# Patient Record
Sex: FEMALE | Race: WHITE | NOT HISPANIC OR LATINO | URBAN - METROPOLITAN AREA
[De-identification: names, ages, dates, MRNs, and addresses within clinical notes are randomized per-mention and may not be internally consistent; named-entity substitution may affect disease eponyms.]

---

## 2017-05-31 ENCOUNTER — HISTORICAL (OUTPATIENT)
Dept: ADMINISTRATIVE | Facility: HOSPITAL | Age: 47
End: 2017-05-31

## 2018-06-11 ENCOUNTER — HISTORICAL (OUTPATIENT)
Dept: ADMINISTRATIVE | Facility: HOSPITAL | Age: 48
End: 2018-06-11

## 2019-09-16 ENCOUNTER — HISTORICAL (OUTPATIENT)
Dept: ADMINISTRATIVE | Facility: HOSPITAL | Age: 49
End: 2019-09-16

## 2019-11-27 ENCOUNTER — HISTORICAL (OUTPATIENT)
Dept: ADMINISTRATIVE | Facility: HOSPITAL | Age: 49
End: 2019-11-27

## 2020-02-11 ENCOUNTER — HISTORICAL (OUTPATIENT)
Dept: ADMINISTRATIVE | Facility: HOSPITAL | Age: 50
End: 2020-02-11

## 2020-02-14 LAB
FINAL CULTURE: NORMAL
GRAM STN SPEC: NORMAL

## 2020-02-16 LAB
FINAL CULTURE: NORMAL
FINAL CULTURE: NORMAL

## 2021-11-12 ENCOUNTER — HISTORICAL (OUTPATIENT)
Dept: ADMINISTRATIVE | Facility: HOSPITAL | Age: 51
End: 2021-11-12

## 2021-11-12 LAB — SARS-COV-2 RNA RESP QL NAA+PROBE: NEGATIVE

## 2022-01-17 ENCOUNTER — HISTORICAL (OUTPATIENT)
Dept: LAB | Facility: HOSPITAL | Age: 52
End: 2022-01-17

## 2022-04-07 ENCOUNTER — HISTORICAL (OUTPATIENT)
Dept: ADMINISTRATIVE | Facility: HOSPITAL | Age: 52
End: 2022-04-07
Payer: COMMERCIAL

## 2022-04-23 VITALS
BODY MASS INDEX: 23.6 KG/M2 | OXYGEN SATURATION: 97 % | HEIGHT: 61 IN | DIASTOLIC BLOOD PRESSURE: 83 MMHG | WEIGHT: 125 LBS | SYSTOLIC BLOOD PRESSURE: 125 MMHG

## 2022-05-01 NOTE — HISTORICAL [facility] CERNER
This is a historical note converted from [employer]. Formatting and pictures may have been removed.  Please reference [employer] for original formatting and attached multimedia. Chief Complaint  Tachycardia, weakness, fatigue, SOB, CP with deep breaths. Froathy white sputum cough. Low grade fever. Seen at  on [place]. Cardio Dr. Rojas & Terrence.  History of Present Illness  Pt is a 49 year old WF with hx of cardiomyopathy and cardiac arrest s/p AICD who presents to the ED with c/o of a productive cough. Pt states she has been suffering with symptoms for approx. 3 weeks.?She states shes experiencing a cough that is producing clear sputum, nasal congestion, a fever, HA, and has vomited. Pt was sent here from  with an abnormal chest x-ray and reports that she cant take a deep breath.? CT revealed mainly LLL infiltrate and?other patchy areas.? Labs largely unremarkable.  Review of Systems  Except as documented all systems were reviewed and are negative.  Physical Exam  Vitals & Measurements  T:?37.5? ?C (Oral)? HR:?123(Peripheral)? HR:?123(Monitored)? RR:?20? BP:?123/71? SpO2:?94%?  General:?in no acute distress  Eye: PERRLA, EOMI, clear conjunctiva, eyelids normal  HEENT:?oropharynx without erythema/exudate, oropharynx and nasal mucosal surfaces moist  Neck: full range of motion, no thyromegaly or lymphadenopathy  Respiratory:?diffuse expiratory wheezes  Cardiovascular:?tachycardic with regular rhythm  Gastrointestinal:?soft, non-tender, non-distended with normal bowel sounds  Musculoskeletal:?full range of motion of all extremities without limitation or discomfort  Integumentary: no rashes or skin lesions present  Neurologic: cranial nerves grossly intact, no signs of peripheral neurological deficit, motor/sensory function intact  Psychiatric: Cooperative  ?  Assessment/Plan  1.?CAP (community acquired pneumonia)?J18.9  ?bacterial, LLL  Ordered:  ?  2.?Sepsis with acute hypoxic respiratory failure without septic  shock?A41.9  ?  3.?Nonischemic congestive cardiomyopathy?I42.0  ?  4.?AICD (automatic cardioverter/defibrillator) present?Z95.810  ?  ?  Plan:  Continue empiric antibiotics, nebulized bronchodilators, supplemental oxygen, and other supportive care.  Cardiology evaluation  Replace magnesium  ?   DVT prophylaxis: Lovenox  PCP: Dr. Radha Monge  Cardiology: Dr. Blane Avila  Code status: full   Problem List/Past Medical History  Historical  ICD - Internal cardiac defibrillator procedure  Nonischemic congestive cardiomyopathy  Procedure/Surgical History  Insertion of Defibrillator Generator into Chest Subcutaneous Tissue and Fascia, Open Approach (08/23/2019)  Removal of Cardiac Rhythm Related Device from Trunk Subcutaneous Tissue and Fascia, Open Approach (08/23/2019)  Bypass Trachea to Cutaneous with Tracheostomy Device, Open Approach (08/20/2019)  Tracheostomy (.) (08/20/2019)  Transfusion of Nonautologous Red Blood Cells into Peripheral Vein, Percutaneous Approach (08/13/2019)  Insertion of Endotracheal Airway into Trachea, Via Natural or Artificial Opening (08/10/2019)  Insertion of Monitoring Device into Lower Artery, Percutaneous Approach (08/10/2019)  Introduction of Vasopressor into Peripheral Vein, Percutaneous Approach (08/10/2019)  Respiratory Ventilation, Greater than 96 Consecutive Hours (08/10/2019)  ICD - Internal cardiac defibrillator procedure   Medications  Home  carvedilol 12.5 mg oral tablet, 12.5 mg= 1 tab(s), Oral, BID  furosemide 20 mg oral tablet, 10 mg= 0.5 tab(s), Oral, Daily,? ?Not Taking, Completed Rx  lisinopril 5 mg oral tablet, 5 mg= 1 tab(s), Oral, Daily  meloxicam 7.5 mg oral tablet, 7.5 mg= 1 tab(s), Oral, Daily  Santyl 250 units/g topical ointment, 1 ricky, TOP, Daily, 1 refills,? ?Not Taking, Completed Rx  Silvadene 1% topical cream, See Instructions, 1 refills,? ?Not Taking, Completed Rx  traZODONE 50 mg oral tablet ( Desyrel ), 50 mg= 1 tab(s), Oral, At Bedtime  Triple Antibiotic  Ointment topical, See Instructions,? ?Not Taking, Completed Rx  Allergies  No Known Allergies  No Known Medication Allergies  Social History  Abuse/Neglect  No, 02/11/2020  Alcohol  Current, Beer, 1-2 times per week, 11/27/2019  Substance Use  Never, 02/11/2020  Tobacco  Never (less than 100 in lifetime), N/A, 02/11/2020  Family History  Cardiac arrest.: Father.  Lab Results  Labs Last 24 Hours?  ?Chemistry? Hematology/Coagulation?   Sodium Lvl: 139 mmol/L (02/11/20 14:39:00) WBC:?11.8 x10(3)/mcL?High (02/11/20 14:39:00)   Potassium Lvl: 3.6 mmol/L (02/11/20 14:39:00) RBC: 4.53 x10(6)/mcL (02/11/20 14:39:00)   Chloride: 104 mmol/L (02/11/20 14:39:00) Hgb: 14.1 gm/dL (02/11/20 14:39:00)   CO2: 29 mmol/L (02/11/20 14:39:00) Hct: 41.9 % (02/11/20 14:39:00)   Calcium Lvl: 9.3 mg/dL (02/11/20 14:39:00) Platelet: 267 x10(3)/mcL (02/11/20 14:39:00)   Magnesium Lvl:?1.7 mg/dL?Low (02/11/20 14:39:00) MCV: 92.5 fL (02/11/20 14:39:00)   Glucose Lvl:?110 mg/dL?High (02/11/20 14:39:00) MCH:?31.1 pg?High (02/11/20 14:39:00)   BUN:?22 mg/dL?High (02/11/20 14:39:00) MCHC: 33.7 gm/dL (02/11/20 14:39:00)   Creatinine: 0.61 mg/dL (02/11/20 14:39:00) RDW: 12.3 % (02/11/20 14:39:00)   eGFR-AA: >60 (02/11/20 14:39:00) MPV: 9.4 fL (02/11/20 14:39:00)   eGFR-PURNIMA: >60 (02/11/20 14:39:00) Abs Neut:?10.13 x10(3)/mcL?High (02/11/20 14:39:00)   Bili Total: 0.7 mg/dL (02/11/20 14:39:00) Neutro Auto: 86 % (02/11/20 14:39:00)   Bili Direct: 0.2 mg/dL (02/11/20 14:39:00) Lymph Auto: 6 % (02/11/20 14:39:00)   Bili Indirect: 0.5 mg/dL (02/11/20 14:39:00) Mono Auto: 7 % (02/11/20 14:39:00)   AST:?13 unit/L?Low (02/11/20 14:39:00) Eos Auto: 0 % (02/11/20 14:39:00)   ALT: 21 unit/L (02/11/20 14:39:00) Abs Eos: 0 x10(3)/mcL (02/11/20 14:39:00)   Alk Phos: 57 unit/L (02/11/20 14:39:00) Basophil Auto: 1 % (02/11/20 14:39:00)   Total Protein: 6.7 gm/dL (02/11/20 14:39:00) Abs Neutro:?10.13 x10(3)/mcL?High (02/11/20 14:39:00)   Albumin Lvl: 3.7 gm/dL  (02/11/20 14:39:00) Abs Lymph: 0.7 x10(3)/mcL (02/11/20 14:39:00)   Globulin: 3 gm/dL (02/11/20 14:39:00) Abs Mono: 0.8 x10(3)/mcL (02/11/20 14:39:00)   A/G Ratio: 1.2 ratio (02/11/20 14:39:00) Abs Baso: 0.1 x10(3)/mcL (02/11/20 14:39:00)   BNP: 52 pg/mL (02/11/20 14:39:00)    Troponin-I: <0.02 (02/11/20 14:39:00)    TSH: 1.65 mIU/L (02/11/20 14:39:00)    Diagnostic Results  Accession:?[id number]  Order:?CT Thorax W Contrast  Report Dt/Tm:?02/11/2020 16:43  Report:?  EXAMINATION: CT the thorax with contrast  ?  EXAMINATION DATE: 2/11/2020  ?  COMPARISON: Chest 2 views from same day  ?  TECHNIQUE: Multiple cross-sectional images of the thorax were obtained  after the intravenous administration of contrast. Coronal and sagittal  reformatted images were obtained.  ?  CLINICAL HISTORY: Weakness  ?  FINDINGS:  ?  The heart size is enlarged the course and caliber the thoracic aorta  is normal. A triple vessel aortic arch is identified with the great  vessels being widely patent. The main pulmonary artery is of normal  caliber. No hilar or mediastinal adenopathy. Incidental note of a left  chest wall AICD.  ?  Consolidative opacity within the left retrocardiac region of the left  lower lobe with associated air bronchogram. The parenchyma is  hypoenhancing. No evidence for adjacent reactive effusion. Also patchy  airspace opacity within the medial segment of the middle lobe. Other  patchy airspace opacities are identified. No pleural thickening or  pleural effusion. The trachea and airways are patent.  ?  The visualized hollow and solid viscera of the upper abdomen are  normal.  ?  No suspicious osseous lesions. The soft tissues are normal.  ?  IMPRESSION:  ?  Consolidative changes of the left lower lobe as well as other patchy  airspace opacities which are likely infectious/inflammatory. Other  secondary findings as above.  ?

## 2022-05-01 NOTE — HISTORICAL [facility]ER
This is a historical note converted from [employer]. Formatting and pictures may have been removed.  Please reference [employer] for original formatting and attached multimedia. Admit and Discharge Dates  Admit Date: 02/11/2020  Discharge Date: 02/13/2020  Physicians  Attending Physician - ER, Physician  Admitting Physician - ER, Physician  Consulting Physician - Margarita PACKER, Blane BAIRES  Consulting Physician - Sarina LOONEY MD, Corona BAIRES  Primary Care Physician - Saleem PACKER, Radha MARTÍNEZ  Discharge Diagnosis  1.?CAP (community acquired pneumonia)?J18.9  2.?Sepsis with acute hypoxic respiratory failure without septic shock?A41.9  3.?Nonischemic congestive cardiomyopathy?I42.0  4.?AICD (automatic cardioverter/defibrillator) present?Z95.810  Surgical Procedures  No procedures recorded for this visit.  Immunizations  No immunizations recorded for this visit.  Hospital Course  49 year old WF with a history of cardiomyopathy and cardiac arrest s/p AICD who presents to the ED with c/o of a productive cough. Pt states she has been suffering with symptoms for approx. 3 weeks. She states shes experiencing a cough that is producing clear sputum, nasal congestion, fever, HA, and has vomited. Pt was sent here from  with an abnormal chest x-ray and reports that she cant take a deep breath. ?CT revealed mainly LLL infiltrate and other patchy areas. ?Labs mostly unremarkable; did have a white count of 11,800 with leftward shift. ? ?She was noted to be tachycardic in the emergency room, though otherwise hemodynamically stable. ?She was started on broad-spectrum antibiotics and admitted to hospital medicine.  Patient was managed for community-acquired pneumonia and was placed on IV ceftriaxone.? Her white cell count trended down to normal. ?No fever spikes?saturating?at goal. ?She is transition to p.o. Augmentin for 5 more days. ?She will continue on Mucinex for cough. ?She will follow-up with her primary care physician in a week.  She is known to have?PVCs,  she will follow-up with her EP physician?on outpatient she is to continue her beta-blocker.  Significant Findings  Labs Last 24 Hours?  ?Hematology/Coagulation?   WBC: 11.5 x10(3)/mcL (02/13/20 03:58:00)   RBC:?3.96 x10(6)/mcL?Low (02/13/20 03:58:00)   Hgb: 12.2 gm/dL (02/13/20 03:58:00)   Hct: 37.4 % (02/13/20 03:58:00)   Platelet: 268 x10(3)/mcL (02/13/20 03:58:00)   MCV:?94.4 fL?High (02/13/20 03:58:00)   MCH: 30.8 pg (02/13/20 03:58:00)   MCHC:?32.6 gm/dL?Low (02/13/20 03:58:00)   RDW: 12.4 % (02/13/20 03:58:00)   MPV: 9.7 fL (02/13/20 03:58:00)   Abs Neut: 8.19 x10(3)/mcL (02/13/20 03:58:00)   Neutro Auto: 71 % (02/13/20 03:58:00)   Lymph Auto: 21 % (02/13/20 03:58:00)   Mono Auto: 5 % (02/13/20 03:58:00)   Eos Auto: 2 % (02/13/20 03:58:00)   Abs Eos: 0.2 x10(3)/mcL (02/13/20 03:58:00)   Basophil Auto: 0 % (02/13/20 03:58:00)   Abs Neutro: 8.19 x10(3)/mcL (02/13/20 03:58:00)   Abs Lymph: 2.4 x10(3)/mcL (02/13/20 03:58:00)   Abs Mono: 0.6 x10(3)/mcL (02/13/20 03:58:00)   Abs Baso: 0 x10(3)/mcL (02/13/20 03:58:00)   ?  ?  CT thorax  IMPRESSION:  ?  Consolidative changes of the left lower lobe as well as other patchy  airspace opacities which are likely infectious/inflammatory. Other  secondary findings as above.  ?  Time Spent on discharge  > 30 minutes  Objective  Vitals & Measurements  T:?36.7? ?C (Oral)? TMIN:?36.6? ?C (Oral)? TMAX:?36.9? ?C (Oral)? HR:?70(Peripheral)? RR:?18? BP:?117/76? SpO2:?95%?  Physical Exam  General: age appropriate, in no acute distress  Eye: pupils are equal , round and reactive to light?  HEENT: Normocephalic  Neck: Supple?  Respiratory; Lungs are clear to auscultation, respirations are non labored?  Cardiovascular: Normal rate and rhythm, s1s2 only no edema?  Gastrointestinal: soft , non tender, normal bowel sounds?  Integumentary: Warm,?  Neurologic: Alert, oriented , no focal deficits?  Psychiatric: cooperative  Patient Discharge Condition  stable  Discharge Disposition  home    Discharge Medication Reconciliation  Prescribed  amoxicillin-clavulanate (Augmentin 875 mg-125 mg oral tablet)?1 tab(s), Oral, q12hr  guaiFENesin (Mucinex 600 mg oral tablet, extended release)?600 mg, Oral, BID  Continue  carvedilol (carvedilol 12.5 mg oral tablet)?12.5 mg, Oral, BID  lisinopril (lisinopril 5 mg oral tablet)?5 mg, Oral, Daily  traZODone (traZODONE 50 mg oral tablet ( Desyrel ))?50 mg, Oral, At Bedtime  Discontinue  bacitracin/neomycin/polymyxin B topical (Triple Antibiotic Ointment topical)?See Instructions  collagenase topical (Santyl 250 units/g topical ointment)?1 ricky, TOP, Daily  furosemide (furosemide 20 mg oral tablet)?10 mg, Oral, Daily  meloxicam (meloxicam 7.5 mg oral tablet)?7.5 mg, Oral, Daily  silver sulfADIAZINE topical (Silvadene 1% topical cream)?See Instructions  Education and Orders Provided  Community-Acquired Pneumonia, Adult, Easy-to-Read  Discharge - 02/13/20 8:38:00 CST, Home?  Follow up  Saleem PACKER, Radha MARTÍNEZ, on 02/19/2020  ????  Mary Avila, on 02/21/2020  Alcon Monterroso MD, on 03/05/2020  ????  PVCs

## 2022-08-10 ENCOUNTER — LAB REQUISITION (OUTPATIENT)
Dept: LAB | Facility: HOSPITAL | Age: 52
End: 2022-08-10
Payer: COMMERCIAL

## 2022-08-10 DIAGNOSIS — E04.9 NONTOXIC GOITER, UNSPECIFIED: ICD-10-CM

## 2022-08-10 DIAGNOSIS — I47.10 SUPRAVENTRICULAR TACHYCARDIA: ICD-10-CM

## 2022-08-10 LAB
ALBUMIN SERPL-MCNC: 3.8 GM/DL (ref 3.5–5)
ALBUMIN/GLOB SERPL: 1.4 RATIO (ref 1.1–2)
ALP SERPL-CCNC: 56 UNIT/L (ref 40–150)
ALT SERPL-CCNC: 17 UNIT/L (ref 0–55)
AST SERPL-CCNC: 23 UNIT/L (ref 5–34)
BILIRUBIN DIRECT+TOT PNL SERPL-MCNC: 0.5 MG/DL
BUN SERPL-MCNC: 13.7 MG/DL (ref 9.8–20.1)
CALCIUM SERPL-MCNC: 9.7 MG/DL (ref 8.4–10.2)
CHLORIDE SERPL-SCNC: 106 MMOL/L (ref 98–107)
CHOLEST SERPL-MCNC: 202 MG/DL
CHOLEST/HDLC SERPL: 2 {RATIO} (ref 0–5)
CO2 SERPL-SCNC: 32 MMOL/L (ref 22–29)
CREAT SERPL-MCNC: 0.8 MG/DL (ref 0.55–1.02)
GFR SERPLBLD CREATININE-BSD FMLA CKD-EPI: >60 MLS/MIN/1.73/M2
GLOBULIN SER-MCNC: 2.8 GM/DL (ref 2.4–3.5)
GLUCOSE SERPL-MCNC: 107 MG/DL (ref 74–100)
HDLC SERPL-MCNC: 94 MG/DL (ref 35–60)
LDLC SERPL CALC-MCNC: 96 MG/DL (ref 50–140)
POTASSIUM SERPL-SCNC: 5.1 MMOL/L (ref 3.5–5.1)
PROT SERPL-MCNC: 6.6 GM/DL (ref 6.4–8.3)
SODIUM SERPL-SCNC: 142 MMOL/L (ref 136–145)
T3FREE SERPL-MCNC: 2.04 PG/ML (ref 1.57–3.91)
T4 FREE SERPL-MCNC: 1.05 NG/DL (ref 0.7–1.48)
TRIGL SERPL-MCNC: 62 MG/DL (ref 37–140)
TSH SERPL-ACNC: 5.43 UIU/ML (ref 0.35–4.94)
VLDLC SERPL CALC-MCNC: 12 MG/DL

## 2022-08-10 PROCEDURE — 84443 ASSAY THYROID STIM HORMONE: CPT | Performed by: INTERNAL MEDICINE

## 2022-08-10 PROCEDURE — 84436 ASSAY OF TOTAL THYROXINE: CPT | Performed by: INTERNAL MEDICINE

## 2022-08-10 PROCEDURE — 84439 ASSAY OF FREE THYROXINE: CPT | Performed by: INTERNAL MEDICINE

## 2022-08-10 PROCEDURE — 84481 FREE ASSAY (FT-3): CPT | Performed by: INTERNAL MEDICINE

## 2022-08-10 PROCEDURE — 80061 LIPID PANEL: CPT | Performed by: INTERNAL MEDICINE

## 2022-08-10 PROCEDURE — 80053 COMPREHEN METABOLIC PANEL: CPT | Performed by: INTERNAL MEDICINE

## 2022-08-11 LAB — T4 SERPL IA-MCNC: 7.8 MCG/DL (ref 4.5–11.7)

## 2024-05-10 ENCOUNTER — OFFICE VISIT (OUTPATIENT)
Dept: URGENT CARE | Facility: CLINIC | Age: 54
End: 2024-05-10
Payer: COMMERCIAL

## 2024-05-10 VITALS
OXYGEN SATURATION: 98 % | HEIGHT: 61 IN | WEIGHT: 140 LBS | HEART RATE: 90 BPM | SYSTOLIC BLOOD PRESSURE: 132 MMHG | DIASTOLIC BLOOD PRESSURE: 90 MMHG | RESPIRATION RATE: 18 BRPM | BODY MASS INDEX: 26.43 KG/M2 | TEMPERATURE: 98 F

## 2024-05-10 DIAGNOSIS — J02.9 SORE THROAT: Primary | ICD-10-CM

## 2024-05-10 LAB
CTP QC/QA: YES
MOLECULAR STREP A: NEGATIVE

## 2024-05-10 PROCEDURE — 99213 OFFICE O/P EST LOW 20 MIN: CPT | Mod: ,,, | Performed by: FAMILY MEDICINE

## 2024-05-10 PROCEDURE — 87651 STREP A DNA AMP PROBE: CPT | Mod: QW,,, | Performed by: FAMILY MEDICINE

## 2024-05-10 RX ORDER — SACUBITRIL AND VALSARTAN 24; 26 MG/1; MG/1
1 TABLET, FILM COATED ORAL 2 TIMES DAILY
COMMUNITY

## 2024-05-10 RX ORDER — SERTRALINE HYDROCHLORIDE 25 MG/1
25 TABLET, FILM COATED ORAL
COMMUNITY
Start: 2021-11-12

## 2024-05-10 RX ORDER — PREDNISONE 20 MG/1
40 TABLET ORAL DAILY
Qty: 8 TABLET | Refills: 0 | Status: SHIPPED | OUTPATIENT
Start: 2024-05-10 | End: 2024-05-14

## 2024-05-10 RX ORDER — CARVEDILOL 6.25 MG/1
6.25 TABLET ORAL
COMMUNITY

## 2024-05-10 RX ORDER — LEVOCETIRIZINE DIHYDROCHLORIDE 5 MG/1
5 TABLET, FILM COATED ORAL EVERY EVENING
COMMUNITY

## 2024-05-10 RX ORDER — ROSUVASTATIN CALCIUM 10 MG/1
10 TABLET, COATED ORAL
COMMUNITY

## 2024-05-10 RX ORDER — CARVEDILOL 12.5 MG/1
12.5 TABLET ORAL 2 TIMES DAILY
COMMUNITY
Start: 2024-02-27

## 2024-05-10 NOTE — PROGRESS NOTES
Subjective:      Patient ID: Negin Garcia is a 54 y.o. female.    Vitals:  vitals were not taken for this visit.     Chief Complaint: No chief complaint on file.    Sore throat x 2 days, sinus drip, mucus.   Denies body aches, fever.  ROS   Objective:     Physical Exam    Assessment:     No diagnosis found.    Plan:       There are no diagnoses linked to this encounter.

## 2024-05-10 NOTE — PROGRESS NOTES
Patient ID: [id number]     Chief Complaint: upper respiratory tract infection symptoms    History of Present Illness:     Negin Garcia is a 54 y.o. female  who presents today for symptoms of No chief complaint on file.    Sore throat x 2 days, sinus drip, mucus. Denies body aches, fever.     Pt denies experiencing any fevers, chills, nausea, vomiting, difficulty breathing, dysphagia, or neck stiffness.    Past Medical History:     -------------------------------------    Cardiomyopathy    Mixed hyperlipidemia        Past Surgical History:   Procedure Laterality Date    CARDIAC DEFIBRILLATOR PLACEMENT      FRACTURE SURGERY         Review of patient's allergies indicates:  No Known Allergies    Outpatient Medications Marked as Taking for the 5/10/24 encounter (Office Visit) with Corona Iyer MD   Medication Sig Dispense Refill    carvediloL (COREG) 12.5 MG tablet Take 12.5 mg by mouth 2 (two) times daily.      carvediloL (COREG) 6.25 MG tablet Take 6.25 mg by mouth.      ENTRESTO 24-26 mg per tablet Take 1 tablet by mouth 2 (two) times daily. as directed.      levocetirizine (XYZAL) 5 MG tablet Take 5 mg by mouth once daily.      rosuvastatin (CRESTOR) 10 MG tablet Take 10 mg by mouth.      sertraline (ZOLOFT) 25 MG tablet Take 25 mg by mouth.         Social History     Socioeconomic History    Marital status:    Tobacco Use    Smoking status: Never    Smokeless tobacco: Never   Substance and Sexual Activity    Alcohol use: Yes     Alcohol/week: 2.0 standard drinks of alcohol     Types: 2 Cans of beer per week        Family History   Problem Relation Name Age of Onset    No Known Problems Mother      Heart disease Sister          Subjective:     Review of Systems   Constitutional:  Negative for chills, fever and malaise/fatigue.   HENT:  Positive for sore throat. Negative for congestion, ear discharge, ear pain and sinus pain.    Respiratory:  Negative for cough, sputum production, shortness of breath,  wheezing and stridor.    Gastrointestinal:  Negative for abdominal pain, diarrhea, nausea and vomiting.   Genitourinary:  Negative for dysuria, frequency and urgency.   Musculoskeletal:  Negative for neck pain.   Skin:  Negative for rash.   Neurological:  Negative for headaches.       Objective:     Vitals:    05/10/24 1501   BP: (!) 132/90   Pulse: 90   Resp: 18   Temp: 98 °F (36.7 °C)     Body mass index is 26.45 kg/m².    Physical Exam  Constitutional:       General: She is not in acute distress.     Appearance: Normal appearance. She is not ill-appearing or toxic-appearing.   HENT:      Head: Normocephalic and atraumatic.      Right Ear: Tympanic membrane and ear canal normal.      Left Ear: Tympanic membrane and ear canal normal.      Nose: No congestion or rhinorrhea.      Mouth/Throat:      Pharynx: Oropharynx is clear. No oropharyngeal exudate or posterior oropharyngeal erythema.      Comments: Plus one bilateral tonsils without exudate or erythema, airway patent, no sign of abscess  Eyes:      General:         Right eye: No discharge.         Left eye: No discharge.      Extraocular Movements: Extraocular movements intact.      Conjunctiva/sclera: Conjunctivae normal.   Cardiovascular:      Rate and Rhythm: Normal rate and regular rhythm.      Heart sounds: Normal heart sounds. No murmur heard.     No gallop.   Pulmonary:      Effort: Pulmonary effort is normal. No respiratory distress.      Breath sounds: Normal breath sounds. No stridor. No wheezing, rhonchi or rales.   Chest:      Chest wall: No tenderness.   Musculoskeletal:      Cervical back: No rigidity or tenderness.   Lymphadenopathy:      Cervical: No cervical adenopathy.   Neurological:      Mental Status: She is alert and oriented to person, place, and time. Mental status is at baseline.   Psychiatric:         Mood and Affect: Mood normal.         Behavior: Behavior normal.         Assessment & Plan:       ICD-10-CM ICD-9-CM   1. Sore throat   J02.9 462        1. Sore throat  -     POCT Strep A, Molecular    Other orders  -     predniSONE (DELTASONE) 20 MG tablet; Take 2 tablets (40 mg total) by mouth once daily. for 4 days  Dispense: 8 tablet; Refill: 0         Strep negative.  Normal exam, lungs clear, vitals stable, no acute distress.  Likely viral pharyngitis versus viral nasal congestion with postnasal drip.      Patient opted for oral steroids for general symptoms.  Pt is not diabetic, has not had a vaccine in the past 2 weeks, does not intend to get a vaccine in the next 2 weeks, does not have any immune conditions, is not taking immunosuppressant medications, and has not had steroids in the past month.  Return precautions given.

## 2024-05-10 NOTE — PATIENT INSTRUCTIONS
Please drink plenty of electrolyte (sodium, potassium) rich fluids such as sugar free Gatorade/Powerade or Pedialyte    Tylenol or Motrin as needed    Please go to the emergency department if you develop any severe changes or worsening in symptoms

## 2024-09-18 ENCOUNTER — OFFICE VISIT (OUTPATIENT)
Dept: ORTHOPEDICS | Facility: CLINIC | Age: 54
End: 2024-09-18
Payer: COMMERCIAL

## 2024-09-18 ENCOUNTER — HOSPITAL ENCOUNTER (OUTPATIENT)
Dept: RADIOLOGY | Facility: CLINIC | Age: 54
Discharge: HOME | End: 2024-09-18
Attending: ORTHOPAEDIC SURGERY
Payer: COMMERCIAL

## 2024-09-18 VITALS
HEIGHT: 61 IN | HEART RATE: 72 BPM | BODY MASS INDEX: 26.43 KG/M2 | DIASTOLIC BLOOD PRESSURE: 84 MMHG | SYSTOLIC BLOOD PRESSURE: 114 MMHG | WEIGHT: 140 LBS

## 2024-09-18 DIAGNOSIS — M25.512 CHRONIC LEFT SHOULDER PAIN: ICD-10-CM

## 2024-09-18 DIAGNOSIS — M62.512 ATROPHY OF MUSCLE OF LEFT SHOULDER: Primary | ICD-10-CM

## 2024-09-18 DIAGNOSIS — G89.29 CHRONIC LEFT SHOULDER PAIN: ICD-10-CM

## 2024-09-18 PROCEDURE — 73030 X-RAY EXAM OF SHOULDER: CPT | Mod: LT,,, | Performed by: ORTHOPAEDIC SURGERY

## 2024-09-18 NOTE — PROGRESS NOTES
Chief Complaint:   Chief Complaint   Patient presents with    Left Shoulder - Pain     Patient is coming in for left shoulder pain, been going on for 2 or 3 years. Pt stated she hasn't seen anyone recently or done PT.       Consulting Physician: No ref. provider found    History of present illness:    she is a pleasant 54 y.o. year old female who has had continued left shoulder pain for prolonged period of time.  The pain initially started with a cardiac event with prolonged hospitalization.  I initially saw her in 2019 and we did a subacromial injection at that point.  She has had increasing pain now over the last couple of years.  She is not using any medications.  She has not done any therapy.  She denies any numbness or tingling.  It does not bother her worse at night but also hurts with motion.  It is impacting her job as a .    Past Medical History:   Diagnosis Date    Cardiomyopathy     Mixed hyperlipidemia        Past Surgical History:   Procedure Laterality Date    CARDIAC DEFIBRILLATOR PLACEMENT      FRACTURE SURGERY         Current Outpatient Medications   Medication Sig    carvediloL (COREG) 12.5 MG tablet Take 12.5 mg by mouth 2 (two) times daily.    carvediloL (COREG) 6.25 MG tablet Take 6.25 mg by mouth.    ENTRESTO 24-26 mg per tablet Take 1 tablet by mouth 2 (two) times daily. as directed.    levocetirizine (XYZAL) 5 MG tablet Take 5 mg by mouth once daily.    rosuvastatin (CRESTOR) 10 MG tablet Take 10 mg by mouth.    sertraline (ZOLOFT) 25 MG tablet Take 25 mg by mouth.     No current facility-administered medications for this visit.       Review of patient's allergies indicates:  No Known Allergies    Family History   Problem Relation Name Age of Onset    No Known Problems Mother      Heart disease Sister         Social History     Socioeconomic History    Marital status:    Tobacco Use    Smoking status: Never    Smokeless tobacco: Never   Substance and Sexual Activity    Alcohol  "use: Yes     Alcohol/week: 2.0 standard drinks of alcohol     Types: 2 Cans of beer per week       Review of Systems:    Constitution:   Denies chills, fever, and sweats.  HENT:   Denies headaches or blurry vision.  Cardiovascular:  Denies chest pain or irregular heart beat.  Respiratory:   Denies cough or shortness of breath.  Gastrointestinal:  Denies abdominal pain, nausea, or vomiting.  Musculoskeletal:   Denies muscle cramps.  Neurological:   Denies dizziness or focal weakness.  Psychiatric/Behavior: Normal mental status.  Hematology/Lymph:  Denies bleeding problem or easy bruising/bleeding.  Skin:    Denies rash or suspicious lesions.    Examination:    Vital Signs:    Vitals:    09/18/24 0903   BP: 114/84   Pulse: 72   Weight: 63.5 kg (139 lb 15.9 oz)   Height: 5' 1" (1.549 m)   PainSc:   2   PainLoc: Shoulder       Body mass index is 26.45 kg/m².    Constitution:   Well-developed, well nourished patient in no acute distress.  Neurological:   Alert and oriented x 3 and cooperative to examination.     Psychiatric/Behavior: Normal mental status.  Respiratory:   No shortness of breath.  Cards:   Pulses palpable, brisk cap refill  Eyes:    Extraoccular muscles intact  Skin:    No scars, rash or suspicious lesions.    MSK:   Shoulder Exam:                   Right        Left  Skin:                                   Normal     Normal  AC joint tenderness:           None         None  Forward Flexion:                180            170  Abduction:                          180           100  External Rotation:               80              80  Internal Rotation:                80             80  Supraspinatus stress test: Neg             +  Hawkin's Impingement:     Neg             +  Neer Impingement:            Neg             +  Apprehension:                   Neg           Neg  [place]'s:                           Neg             +  Speed's test:                     Neg              +  Biceps Tenderness:        " Neg            Neg  Maggie                          Neg            Neg  Strength:  External Rotation:           5/5                5/5  Lift Off/belly press:          5/5                5/5    N-V status:                   Intact             Intact    C-spine: Normal ROM, NT      Imaging: X-rays ordered and images interpreted today personally by me of four views left shoulder show inferior subluxation of the humeral head.         Assessment: Atrophy of muscle of left shoulder  -     X-Ray Shoulder 2 or More Views Left; Future; Expected date: 09/18/2024        Plan:  I am going to start her on some formal physical therapy.  I will see her back in a couple of months for recheck.  If her pain persists we could consider CT arthrogram and/or EMG.

## 2024-11-05 PROBLEM — I47.10 SUPRAVENTRICULAR TACHYCARDIA: Status: ACTIVE | Noted: 2024-11-05

## 2024-11-05 PROBLEM — I34.1 MITRAL PROLAPSE: Status: ACTIVE | Noted: 2024-11-05

## 2024-11-05 PROBLEM — I42.0 DILATED CARDIOMYOPATHY: Status: ACTIVE | Noted: 2024-11-05

## 2024-11-05 PROBLEM — I34.0 NONRHEUMATIC MITRAL VALVE REGURGITATION: Status: ACTIVE | Noted: 2024-11-05

## 2024-11-05 PROBLEM — I47.20 VENTRICULAR TACHYCARDIA: Status: ACTIVE | Noted: 2024-11-05

## 2025-06-25 ENCOUNTER — LAB VISIT (OUTPATIENT)
Dept: LAB | Facility: HOSPITAL | Age: 55
End: 2025-06-25
Attending: INTERNAL MEDICINE
Payer: COMMERCIAL

## 2025-06-25 DIAGNOSIS — I34.0 MITRAL VALVE INSUFFICIENCY, UNSPECIFIED ETIOLOGY: ICD-10-CM

## 2025-06-25 DIAGNOSIS — Z79.899 POLYPHARMACY: Primary | ICD-10-CM

## 2025-06-25 DIAGNOSIS — I42.0 CONGESTIVE CARDIOMYOPATHY: ICD-10-CM

## 2025-06-25 DIAGNOSIS — E78.5 HYPERLIPIDEMIA, UNSPECIFIED HYPERLIPIDEMIA TYPE: ICD-10-CM

## 2025-06-25 LAB
ALBUMIN SERPL-MCNC: 3.7 G/DL (ref 3.5–5)
ALBUMIN/GLOB SERPL: 0.9 RATIO (ref 1.1–2)
ALP SERPL-CCNC: 67 UNIT/L (ref 40–150)
ALT SERPL-CCNC: 20 UNIT/L (ref 0–55)
ANION GAP SERPL CALC-SCNC: 7 MEQ/L
AST SERPL-CCNC: 22 UNIT/L (ref 11–45)
BILIRUB SERPL-MCNC: 0.6 MG/DL
BUN SERPL-MCNC: 12.1 MG/DL (ref 9.8–20.1)
CALCIUM SERPL-MCNC: 9.6 MG/DL (ref 8.4–10.2)
CHLORIDE SERPL-SCNC: 105 MMOL/L (ref 98–107)
CHOLEST SERPL-MCNC: 205 MG/DL
CHOLEST/HDLC SERPL: 2 {RATIO} (ref 0–5)
CO2 SERPL-SCNC: 28 MMOL/L (ref 22–29)
CREAT SERPL-MCNC: 0.67 MG/DL (ref 0.55–1.02)
CREAT/UREA NIT SERPL: 18
GFR SERPLBLD CREATININE-BSD FMLA CKD-EPI: >60 ML/MIN/1.73/M2
GLOBULIN SER-MCNC: 4 GM/DL (ref 2.4–3.5)
GLUCOSE SERPL-MCNC: 101 MG/DL (ref 74–100)
HDLC SERPL-MCNC: 87 MG/DL (ref 35–60)
LDLC SERPL CALC-MCNC: 98 MG/DL (ref 50–140)
MAGNESIUM SERPL-MCNC: 2 MG/DL (ref 1.6–2.6)
POTASSIUM SERPL-SCNC: 5.3 MMOL/L (ref 3.5–5.1)
PROT SERPL-MCNC: 7.7 GM/DL (ref 6.4–8.3)
SODIUM SERPL-SCNC: 140 MMOL/L (ref 136–145)
TRIGL SERPL-MCNC: 101 MG/DL (ref 37–140)
VLDLC SERPL CALC-MCNC: 20 MG/DL

## 2025-06-25 PROCEDURE — 80053 COMPREHEN METABOLIC PANEL: CPT

## 2025-06-25 PROCEDURE — 80061 LIPID PANEL: CPT

## 2025-06-25 PROCEDURE — 36415 COLL VENOUS BLD VENIPUNCTURE: CPT

## 2025-06-25 PROCEDURE — 83735 ASSAY OF MAGNESIUM: CPT
